# Patient Record
Sex: FEMALE | Race: WHITE | NOT HISPANIC OR LATINO | Employment: OTHER | URBAN - METROPOLITAN AREA
[De-identification: names, ages, dates, MRNs, and addresses within clinical notes are randomized per-mention and may not be internally consistent; named-entity substitution may affect disease eponyms.]

---

## 2017-10-30 ENCOUNTER — OFFICE VISIT (OUTPATIENT)
Dept: URGENT CARE | Facility: CLINIC | Age: 67
End: 2017-10-30
Payer: COMMERCIAL

## 2017-10-30 PROCEDURE — 99203 OFFICE O/P NEW LOW 30 MIN: CPT

## 2017-10-31 NOTE — PROGRESS NOTES
Assessment  1  Tick bite (919 4,E906 [id number]) [facility])    Discussion/Summary  Discussion Summary:   Observe the bite site daily  If you see a red circular bull's eye type rash return immediately  If you develop headache fever joint aches or other illness within the next 30 days tell your doctor that you had a tick removed on the 29th of October  Chief Complaint  1  Skin Wound  Chief Complaint Free Text Note Form: The patient reports removing a small tick from her abdomen yesterday  She reports the bite site is painful to the touch and it is reddened  History of Present Illness  HPI: This patient removed a very small dark colored tick from her anterior abdomen yesterday  She now has an area of erythema approximately 2 cm surrounding the bite site with some edema and tenderness  No ECM rash  No fluctuance or abscess formation  No drainage  No systemic symptoms  No fever no chills no joint aches no headache no nausea  elects to take the 2 dose Lyme disease doxycycline prophylactic treatment which we will dispense  Hospital Based Practices Required Assessment:   Pain Assessment   the patient states they do not have pain  Abuse And Domestic Violence Screen    Yes, the patient is safe at home  -- The patient states no one is hurting them  Depression And Suicide Screen  No, the patient has not had thoughts of hurting themself  No, the patient has not felt depressed in the past 7 days  Past Medical History  1  No pertinent past medical history    Social History   · Former smoker ([id number]) ([id number])    Current Meds   1  No Reported Medications Recorded  Medication List Reviewed: The medication list was reviewed and updated today  Allergies  1   Codeine    Vitals  Signs   Recorded: [id number] 01:00PM   Temperature: 98 4 F  Heart Rate: 100  Respiration: 14  Systolic: 126  Diastolic: 82  Height: 5 ft   Weight: 210 lb   BMI Calculated: 41 01  BSA Calculated: 1 91  O2 Saturation: 94  Pain Scale: 0    Signatures   Electronically signed by : CARSON Morfin ; Oct 30 2017  1:10PM EST                       (Author)